# Patient Record
Sex: FEMALE | Race: WHITE | Employment: UNEMPLOYED | URBAN - METROPOLITAN AREA
[De-identification: names, ages, dates, MRNs, and addresses within clinical notes are randomized per-mention and may not be internally consistent; named-entity substitution may affect disease eponyms.]

---

## 2019-03-03 ENCOUNTER — APPOINTMENT (OUTPATIENT)
Dept: CT IMAGING | Age: 59
End: 2019-03-03
Payer: OTHER MISCELLANEOUS

## 2019-03-03 ENCOUNTER — HOSPITAL ENCOUNTER (EMERGENCY)
Age: 59
Discharge: HOME | End: 2019-03-03
Payer: OTHER MISCELLANEOUS

## 2019-03-03 VITALS
DIASTOLIC BLOOD PRESSURE: 73 MMHG | BODY MASS INDEX: 30.36 KG/M2 | OXYGEN SATURATION: 97 % | HEIGHT: 62 IN | TEMPERATURE: 97.9 F | HEART RATE: 82 BPM | WEIGHT: 165 LBS | SYSTOLIC BLOOD PRESSURE: 111 MMHG | RESPIRATION RATE: 18 BRPM
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
## 2019-03-03 DIAGNOSIS — S00.83XA CONTUSION OF FACE, INITIAL ENCOUNTER: ICD-10-CM

## 2019-03-03 DIAGNOSIS — S09.90XA CLOSED HEAD INJURY, INITIAL ENCOUNTER: ICD-10-CM

## 2019-03-03 PROCEDURE — 6370000000 HC RX 637 (ALT 250 FOR IP): Performed by: PHYSICIAN ASSISTANT

## 2019-03-03 PROCEDURE — 99284 EMERGENCY DEPT VISIT MOD MDM: CPT

## 2019-03-03 PROCEDURE — 70450 CT HEAD/BRAIN W/O DYE: CPT

## 2019-03-03 PROCEDURE — 70486 CT MAXILLOFACIAL W/O DYE: CPT

## 2019-03-03 RX ORDER — BUTALBITAL, ACETAMINOPHEN AND CAFFEINE 50; 325; 40 MG/1; MG/1; MG/1
1 TABLET ORAL EVERY 6 HOURS PRN
Qty: 20 TABLET | Refills: 0 | Status: SHIPPED | OUTPATIENT
Start: 2019-03-03 | End: 2019-08-08 | Stop reason: ALTCHOICE

## 2019-03-03 RX ORDER — BUTALBITAL, ACETAMINOPHEN AND CAFFEINE 50; 325; 40 MG/1; MG/1; MG/1
2 TABLET ORAL ONCE
Status: COMPLETED | OUTPATIENT
Start: 2019-03-03 | End: 2019-03-03

## 2019-03-03 RX ADMIN — BUTALBITAL, ACETAMINOPHEN AND CAFFEINE 2 TABLET: 50; 325; 40 TABLET ORAL at 02:53

## 2019-03-03 ASSESSMENT — PAIN SCALES - GENERAL: PAINLEVEL_OUTOF10: 8

## 2019-08-08 ENCOUNTER — HOSPITAL ENCOUNTER (OUTPATIENT)
Age: 59
Setting detail: OBSERVATION
Discharge: HOME | End: 2019-08-09
Attending: EMERGENCY MEDICINE | Admitting: FAMILY MEDICINE
Payer: COMMERCIAL

## 2019-08-08 ENCOUNTER — APPOINTMENT (OUTPATIENT)
Dept: GENERAL RADIOLOGY | Age: 59
End: 2019-08-08
Payer: COMMERCIAL

## 2019-08-08 DIAGNOSIS — R07.9 CHEST PAIN, UNSPECIFIED TYPE: Primary | ICD-10-CM

## 2019-08-08 DIAGNOSIS — F17.200 SMOKER: ICD-10-CM

## 2019-08-08 DIAGNOSIS — R94.31 ABNORMAL EKG: ICD-10-CM

## 2019-08-08 LAB
ANION GAP SERPL CALCULATED.3IONS-SCNC: 14 MMOL/L (ref 3–16)
BASOPHILS ABSOLUTE: 0.1 K/UL (ref 0–0.2)
BASOPHILS RELATIVE PERCENT: 0.6 %
BUN BLDV-MCNC: 17 MG/DL (ref 7–20)
CALCIUM SERPL-MCNC: 9.2 MG/DL (ref 8.3–10.6)
CHLORIDE BLD-SCNC: 104 MMOL/L (ref 99–110)
CO2: 23 MMOL/L (ref 21–32)
CREAT SERPL-MCNC: 0.8 MG/DL (ref 0.6–1.1)
EOSINOPHILS ABSOLUTE: 0.1 K/UL (ref 0–0.6)
EOSINOPHILS RELATIVE PERCENT: 0.7 %
GFR AFRICAN AMERICAN: >60
GFR NON-AFRICAN AMERICAN: >60
GLUCOSE BLD-MCNC: 103 MG/DL (ref 70–99)
HCT VFR BLD CALC: 41 % (ref 36–48)
HEMOGLOBIN: 13.4 G/DL (ref 12–16)
LYMPHOCYTES ABSOLUTE: 2.8 K/UL (ref 1–5.1)
LYMPHOCYTES RELATIVE PERCENT: 20.8 %
MCH RBC QN AUTO: 30.4 PG (ref 26–34)
MCHC RBC AUTO-ENTMCNC: 32.8 G/DL (ref 31–36)
MCV RBC AUTO: 92.7 FL (ref 80–100)
MONOCYTES ABSOLUTE: 0.7 K/UL (ref 0–1.3)
MONOCYTES RELATIVE PERCENT: 5.2 %
NEUTROPHILS ABSOLUTE: 9.9 K/UL (ref 1.7–7.7)
NEUTROPHILS RELATIVE PERCENT: 72.7 %
PDW BLD-RTO: 14 % (ref 12.4–15.4)
PLATELET # BLD: 301 K/UL (ref 135–450)
PMV BLD AUTO: 9.2 FL (ref 5–10.5)
POTASSIUM SERPL-SCNC: 3.6 MMOL/L (ref 3.5–5.1)
PRO-BNP: 30 PG/ML (ref 0–124)
RBC # BLD: 4.42 M/UL (ref 4–5.2)
SODIUM BLD-SCNC: 141 MMOL/L (ref 136–145)
TROPONIN: <0.01 NG/ML
WBC # BLD: 13.6 K/UL (ref 4–11)

## 2019-08-08 PROCEDURE — 6370000000 HC RX 637 (ALT 250 FOR IP): Performed by: FAMILY MEDICINE

## 2019-08-08 PROCEDURE — 84484 ASSAY OF TROPONIN QUANT: CPT

## 2019-08-08 PROCEDURE — 6360000002 HC RX W HCPCS: Performed by: FAMILY MEDICINE

## 2019-08-08 PROCEDURE — 2580000003 HC RX 258: Performed by: FAMILY MEDICINE

## 2019-08-08 PROCEDURE — 6370000000 HC RX 637 (ALT 250 FOR IP): Performed by: NURSE PRACTITIONER

## 2019-08-08 PROCEDURE — G0378 HOSPITAL OBSERVATION PER HR: HCPCS

## 2019-08-08 PROCEDURE — 36415 COLL VENOUS BLD VENIPUNCTURE: CPT

## 2019-08-08 PROCEDURE — 99285 EMERGENCY DEPT VISIT HI MDM: CPT

## 2019-08-08 PROCEDURE — 85025 COMPLETE CBC W/AUTO DIFF WBC: CPT

## 2019-08-08 PROCEDURE — 80048 BASIC METABOLIC PNL TOTAL CA: CPT

## 2019-08-08 PROCEDURE — 71046 X-RAY EXAM CHEST 2 VIEWS: CPT

## 2019-08-08 PROCEDURE — 94761 N-INVAS EAR/PLS OXIMETRY MLT: CPT

## 2019-08-08 PROCEDURE — 94640 AIRWAY INHALATION TREATMENT: CPT

## 2019-08-08 PROCEDURE — 93005 ELECTROCARDIOGRAM TRACING: CPT | Performed by: EMERGENCY MEDICINE

## 2019-08-08 PROCEDURE — 96372 THER/PROPH/DIAG INJ SC/IM: CPT

## 2019-08-08 PROCEDURE — 83880 ASSAY OF NATRIURETIC PEPTIDE: CPT

## 2019-08-08 RX ORDER — NITROGLYCERIN 0.4 MG/1
0.4 TABLET SUBLINGUAL EVERY 5 MIN PRN
Status: DISCONTINUED | OUTPATIENT
Start: 2019-08-08 | End: 2019-08-09 | Stop reason: HOSPADM

## 2019-08-08 RX ORDER — SODIUM CHLORIDE 0.9 % (FLUSH) 0.9 %
10 SYRINGE (ML) INJECTION PRN
Status: DISCONTINUED | OUTPATIENT
Start: 2019-08-08 | End: 2019-08-09 | Stop reason: HOSPADM

## 2019-08-08 RX ORDER — PSEUDOEPHEDRINE HYDROCHLORIDE 30 MG/1
30 TABLET ORAL EVERY 4 HOURS PRN
Status: ON HOLD | COMMUNITY
End: 2019-08-09 | Stop reason: HOSPADM

## 2019-08-08 RX ORDER — ASPIRIN 81 MG/1
81 TABLET, CHEWABLE ORAL DAILY
Status: DISCONTINUED | OUTPATIENT
Start: 2019-08-08 | End: 2019-08-09 | Stop reason: HOSPADM

## 2019-08-08 RX ORDER — ALBUTEROL SULFATE 90 UG/1
2 AEROSOL, METERED RESPIRATORY (INHALATION) EVERY 6 HOURS PRN
Status: DISCONTINUED | OUTPATIENT
Start: 2019-08-08 | End: 2019-08-09 | Stop reason: HOSPADM

## 2019-08-08 RX ORDER — SODIUM CHLORIDE 0.9 % (FLUSH) 0.9 %
10 SYRINGE (ML) INJECTION EVERY 12 HOURS SCHEDULED
Status: DISCONTINUED | OUTPATIENT
Start: 2019-08-08 | End: 2019-08-09 | Stop reason: HOSPADM

## 2019-08-08 RX ORDER — ONDANSETRON 2 MG/ML
4 INJECTION INTRAMUSCULAR; INTRAVENOUS EVERY 6 HOURS PRN
Status: DISCONTINUED | OUTPATIENT
Start: 2019-08-08 | End: 2019-08-09 | Stop reason: HOSPADM

## 2019-08-08 RX ADMIN — Medication 2 PUFF: at 21:02

## 2019-08-08 RX ADMIN — NITROGLYCERIN 1 INCH: 20 OINTMENT TOPICAL at 18:05

## 2019-08-08 RX ADMIN — ASPIRIN 81 MG 81 MG: 81 TABLET ORAL at 19:58

## 2019-08-08 RX ADMIN — ENOXAPARIN SODIUM 40 MG: 40 INJECTION SUBCUTANEOUS at 19:58

## 2019-08-08 RX ADMIN — Medication 10 ML: at 20:01

## 2019-08-08 ASSESSMENT — ENCOUNTER SYMPTOMS
SHORTNESS OF BREATH: 1
VOMITING: 0
CHEST TIGHTNESS: 0
ABDOMINAL PAIN: 0
DIARRHEA: 0
NAUSEA: 0

## 2019-08-08 ASSESSMENT — PAIN SCALES - GENERAL
PAINLEVEL_OUTOF10: 1
PAINLEVEL_OUTOF10: 0
PAINLEVEL_OUTOF10: 0

## 2019-08-08 ASSESSMENT — PAIN DESCRIPTION - LOCATION: LOCATION: CHEST

## 2019-08-08 ASSESSMENT — HEART SCORE: ECG: 1

## 2019-08-08 ASSESSMENT — PAIN DESCRIPTION - PAIN TYPE: TYPE: ACUTE PAIN

## 2019-08-08 NOTE — PROGRESS NOTES
Pt admitted to room 3304. Oriented to room and call light system. Pt has current everyday smoker. Under a lot of stress lately. No hx CAD or heart attack. Updated pt on POC. Aware of NPO @ midnight.

## 2019-08-09 VITALS
RESPIRATION RATE: 16 BRPM | HEIGHT: 62 IN | BODY MASS INDEX: 31.83 KG/M2 | WEIGHT: 173 LBS | TEMPERATURE: 97.4 F | SYSTOLIC BLOOD PRESSURE: 100 MMHG | DIASTOLIC BLOOD PRESSURE: 61 MMHG | HEART RATE: 82 BPM | OXYGEN SATURATION: 94 %

## 2019-08-09 LAB
EKG ATRIAL RATE: 94 BPM
EKG DIAGNOSIS: NORMAL
EKG P AXIS: 77 DEGREES
EKG P-R INTERVAL: 120 MS
EKG Q-T INTERVAL: 362 MS
EKG QRS DURATION: 82 MS
EKG QTC CALCULATION (BAZETT): 452 MS
EKG R AXIS: 61 DEGREES
EKG T AXIS: 87 DEGREES
EKG VENTRICULAR RATE: 94 BPM
LEFT VENTRICULAR EJECTION FRACTION HIGH VALUE: 65 %
LEFT VENTRICULAR EJECTION FRACTION MODE: NORMAL
LV EF: 64 %
LV EF: 65 %
LVEF MODALITY: NORMAL
LVEF MODALITY: NORMAL

## 2019-08-09 PROCEDURE — 6370000000 HC RX 637 (ALT 250 FOR IP): Performed by: FAMILY MEDICINE

## 2019-08-09 PROCEDURE — 3430000000 HC RX DIAGNOSTIC RADIOPHARMACEUTICAL: Performed by: INTERNAL MEDICINE

## 2019-08-09 PROCEDURE — 78452 HT MUSCLE IMAGE SPECT MULT: CPT | Performed by: INTERNAL MEDICINE

## 2019-08-09 PROCEDURE — G0378 HOSPITAL OBSERVATION PER HR: HCPCS

## 2019-08-09 PROCEDURE — 93017 CV STRESS TEST TRACING ONLY: CPT | Performed by: INTERNAL MEDICINE

## 2019-08-09 PROCEDURE — A9502 TC99M TETROFOSMIN: HCPCS | Performed by: INTERNAL MEDICINE

## 2019-08-09 PROCEDURE — 93010 ELECTROCARDIOGRAM REPORT: CPT | Performed by: INTERNAL MEDICINE

## 2019-08-09 PROCEDURE — 93306 TTE W/DOPPLER COMPLETE: CPT

## 2019-08-09 PROCEDURE — 6370000000 HC RX 637 (ALT 250 FOR IP): Performed by: INTERNAL MEDICINE

## 2019-08-09 PROCEDURE — 2580000003 HC RX 258: Performed by: FAMILY MEDICINE

## 2019-08-09 RX ORDER — ALBUTEROL SULFATE 90 UG/1
2 AEROSOL, METERED RESPIRATORY (INHALATION) EVERY 6 HOURS PRN
Qty: 1 INHALER | Refills: 3 | Status: SHIPPED | OUTPATIENT
Start: 2019-08-09

## 2019-08-09 RX ORDER — ACETAMINOPHEN 500 MG
1000 TABLET ORAL EVERY 6 HOURS PRN
Status: DISCONTINUED | OUTPATIENT
Start: 2019-08-09 | End: 2019-08-09 | Stop reason: HOSPADM

## 2019-08-09 RX ADMIN — TETROFOSMIN 30 MILLICURIE: 1.38 INJECTION, POWDER, LYOPHILIZED, FOR SOLUTION INTRAVENOUS at 08:37

## 2019-08-09 RX ADMIN — ACETAMINOPHEN 1000 MG: 500 TABLET ORAL at 04:01

## 2019-08-09 RX ADMIN — TETROFOSMIN 10 MILLICURIE: 1.38 INJECTION, POWDER, LYOPHILIZED, FOR SOLUTION INTRAVENOUS at 07:25

## 2019-08-09 RX ADMIN — Medication 10 ML: at 09:49

## 2019-08-09 RX ADMIN — ASPIRIN 81 MG 81 MG: 81 TABLET ORAL at 09:49

## 2019-08-09 ASSESSMENT — PAIN SCALES - GENERAL
PAINLEVEL_OUTOF10: 0
PAINLEVEL_OUTOF10: 0
PAINLEVEL_OUTOF10: 7
PAINLEVEL_OUTOF10: 0

## 2019-08-09 ASSESSMENT — PAIN DESCRIPTION - DESCRIPTORS: DESCRIPTORS: HEADACHE

## 2019-08-09 ASSESSMENT — PAIN DESCRIPTION - ONSET: ONSET: AWAKENED FROM SLEEP

## 2019-08-09 ASSESSMENT — PAIN DESCRIPTION - PAIN TYPE: TYPE: ACUTE PAIN

## 2019-08-09 NOTE — PROGRESS NOTES
Pt c/o a migraine states she normally takes Excedrin at home. I explained to Pt due to caffeine content we couldn't use that, but that I would send a message to MD to see if we could get her something else. Awaiting return message.

## 2019-08-09 NOTE — ED PROVIDER NOTES
[address], [address] [place] [address]  [id number]  [facility] [phone]            Discharge Medications:  Current Discharge Medication List          FINAL IMPRESSION  1. Chest pain, unspecified type    2. Abnormal EKG    3. Smoker        Blood pressure 119/68, pulse 92, temperature 97.8 °F (36.6 °C), temperature source Oral, resp. rate 18, height 5' 2\" (1.575 m), weight 173 lb 6 oz (78.6 kg), SpO2 95 %.      For further details of [facility] emergency department encounter, please see documentation by advanced practice provider, Estelle Sumner MD  08/08/19 4824
complete resolution of pain. She was at rest at time of pain with mild shortness of breath. She denies  obvious exacerbating factor. Patient is a cigarette smoker. She reports significant family history of coronary artery disease. She does not believe that she has history of hypertension, hyperlipidemia, or diabetes although she does not follow with primary care regularly. Patient is pain-free. She reports that the pain was dull and aching/pressure-like. CBC shows a leukocytosis white count of 13,600. BMP is unremarkable. Troponin negative. Chest x-ray shows no acute cardiopulmonary abnormality. BNP 30. Patient did take aspirin prior to arrival.  She is pain-free, Nitropaste was placed. She is never had a stress test or echocardiogram.  Heart score is 4-5. Her EKG has changed and she has new lateral T-wave inversions in the lateral leads, please see Dr. Lori Kelly interpretation. Patient will be admitted to hospitalist services for cardiac rule out. Patient is agreeable regarding plan of care. FINAL IMPRESSION      1. Chest pain, unspecified type    2. Abnormal EKG    3. Smoker          DISPOSITION/PLAN   DISPOSITION        PATIENT REFERREDTO:  No follow-up provider specified.     DISCHARGE MEDICATIONS:  New Prescriptions    No medications on file       DISCONTINUED MEDICATIONS:  Discontinued Medications    No medications on file              (Please note that portions ofthis note were completed with a voice recognition program.  Efforts were made to edit the dictations but occasionally words are mis-transcribed.)    Eartha Galeazzi, APRN - CNP (electronically signed)           Eartha Galeazzi, APRN - CNP  08/08/19 3214

## 2019-08-09 NOTE — PROGRESS NOTES
Patient instructed on Dany Protocol Stress Test Procedure including possible side effects and adverse reactions. Verbalizes knowledge and understanding and denies having any questions.

## 2019-08-09 NOTE — H&P
HOSPITALISTS HISTORY AND PHYSICAL    8/8/2019 10:21 PM    Patient Information:  Niels Kapoor is a 64 y.o. female [id number]  PCP:  Chago Hager MD (Tel: [phone] )    Chief complaint:    Chief Complaint   Patient presents with    Chest Pain     patient reports chest pain, with left arm pain and tingling and numbness around her lower face. took a couple aspirin and started to feel better around 1230. still has some tingling to left arm. has been under a lot of stress        History of Present Illness: Darell Cruz is a 64 y.o. female who presented with  H/o COPD, smoker presents with substernal chest pain with numbness down left arm present at rest. The sx occurred today while she was sitting watching tv. She has increased stressors at home. She took ASA that resolved the pain . She also reports intermittent leg swelling when she consumes extra salt. The pt has h/o GERD these sx are different today. No recent stress test.   Troponin is normal cxray is neg. EKG showed T wave changes. She is chest pain free now  REVIEW OF SYSTEMS:   Constitutional: Negative for fever,chills or night sweats  ENT: Negative for rhinorrhea, epistaxis, hoarseness, sore throat. Respiratory: Negative for shortness of breath,wheezing  Cardiovascular: +ve for chest pain, palpitations   Gastrointestinal: Negative for nausea, vomiting, diarrhea  Genitourinary: Negative for polyuria, dysuria   Hematologic/Lymphatic: Negative for bleeding tendency, easy bruising  Musculoskeletal: Negative for myalgias and arthralgias  Neurologic: Negative for confusion,dysarthria. Skin: Negative for itching,rash  Psychiatric: Negative for depression,anxiety, agitation. Endocrine: Negative for polydipsia,polyuria,heat /cold intolerance.     Past Medical History:   has a past medical history of Asthma, Bipolar 1 disorder ([facility] [address] COPD (chronic obstructive pulmonary disease) ([facility] [address] 81 mg Oral Daily Jose Antonio Olson MD   81 mg at 08/08/19 1958       Allergies:  No Known Allergies     Social History:   reports that she has been smoking cigarettes. She has a 35.00 pack-year smoking history. She has never used smokeless tobacco. She reports that she does not drink alcohol or use drugs. Family History:  family history includes Heart Attack in her mother and paternal grandmother; Heart Disease in her mother and paternal grandmother. ,     Physical Exam:  /68   Pulse 92   Temp 97.8 °F (36.6 °C) (Oral)   Resp 18   Ht 5' 2\" (1.575 m)   Wt 173 lb 6 oz (78.6 kg)   SpO2 95%   BMI 31.71 kg/m²     General appearance:  Appears comfortable. Well nourished  Eyes: Sclera clear, pupils equal  ENT: Moist mucus membranes, no thrush. Trachea midline. Cardiovascular: Regular rhythm, normal S1, S2. No murmur, gallop, rub. No edema in lower extremities  Respiratory: Clear to auscultation bilaterally, no wheeze, good inspiratory effort  Gastrointestinal: Abdomen soft, non-tender, not distended, normal bowel sounds  Musculoskeletal: No cyanosis in digits, neck supple  Neurology: Cranial nerves grossly intact. Alert and oriented in time, place and person. No speech or motor deficits  Psychiatry: Appropriate affect.  Not agitated  Skin: Warm, dry, normal turgor, no rash    Labs:  CBC:   Lab Results   Component Value Date    WBC 13.6 08/08/2019    RBC 4.42 08/08/2019    HGB 13.4 08/08/2019    HCT 41.0 08/08/2019    MCV 92.7 08/08/2019    MCH 30.4 08/08/2019    MCHC 32.8 08/08/2019    RDW 14.0 08/08/2019     08/08/2019    MPV 9.2 08/08/2019     BMP:    Lab Results   Component Value Date     08/08/2019    K 3.6 08/08/2019     08/08/2019    CO2 23 08/08/2019    BUN 17 08/08/2019    CREATININE 0.8 08/08/2019    CALCIUM 9.2 08/08/2019    GFRAA >60 08/08/2019    GFRAA >60 06/21/2011    LABGLOM >60 08/08/2019    GLUCOSE 103 08/08/2019       Chest Xray:   EKG:        Problem List  Active

## 2019-08-09 NOTE — DISCHARGE SUMMARY
[address]    Phone:  [phone]   · albuterol sulfate  (90 Base) MCG/ACT inhaler  · mometasone-formoterol 200-5 MCG/ACT inhaler         Discharge recommendations given to patient. Follow Up. in 1 week   Disposition. Home   Activity. As tolerated   Diet: Diet NPO, After Midnight      Spent > 30 minutes in discharge process.     Signed:  ERIN Guerrero CNP     8/9/2019 7:35 AM

## 2019-08-09 NOTE — CARE COORDINATION
Patient admitted as Observation/OPIB with an anticipated short hospitalization length of stay. Chart reviewed and it appears that patient has minimal needs for discharge at this time. Discussed with patients nurse and requested that case management be notified if discharge needs are identified. *Case management will continue to follow progress and update discharge plan as needed.     Rolo Andrew, BSN, CCM, RN  [facility]  [phone]

## 2022-02-28 ENCOUNTER — OFFICE VISIT (OUTPATIENT)
Dept: PULMONOLOGY | Age: 62
End: 2022-02-28
Payer: COMMERCIAL

## 2022-02-28 VITALS — HEART RATE: 68 BPM | OXYGEN SATURATION: 96 %

## 2022-02-28 DIAGNOSIS — F17.200 TOBACCO USE DISORDER: ICD-10-CM

## 2022-02-28 DIAGNOSIS — J96.11 CHRONIC HYPOXEMIC RESPIRATORY FAILURE (HCC): ICD-10-CM

## 2022-02-28 DIAGNOSIS — J44.9 COPD, MODERATE (HCC): ICD-10-CM

## 2022-02-28 DIAGNOSIS — J43.2 CENTRILOBULAR EMPHYSEMA (HCC): ICD-10-CM

## 2022-02-28 DIAGNOSIS — R06.02 SOB (SHORTNESS OF BREATH): Primary | ICD-10-CM

## 2022-02-28 PROCEDURE — 99204 OFFICE O/P NEW MOD 45 MIN: CPT | Performed by: INTERNAL MEDICINE

## 2022-02-28 RX ORDER — BUDESONIDE AND FORMOTEROL FUMARATE DIHYDRATE 160; 4.5 UG/1; UG/1
2 AEROSOL RESPIRATORY (INHALATION) 2 TIMES DAILY
COMMUNITY

## 2022-02-28 RX ORDER — IPRATROPIUM BROMIDE AND ALBUTEROL SULFATE 2.5; .5 MG/3ML; MG/3ML
1 SOLUTION RESPIRATORY (INHALATION) EVERY 6 HOURS PRN
Qty: 360 ML | Refills: 11 | Status: SHIPPED | OUTPATIENT
Start: 2022-02-28

## 2022-02-28 RX ORDER — CHOLECALCIFEROL (VITAMIN D3) 1250 MCG
CAPSULE ORAL
COMMUNITY

## 2022-02-28 ASSESSMENT — ENCOUNTER SYMPTOMS
ABDOMINAL PAIN: 0
SINUS PRESSURE: 0
NAUSEA: 0
CONSTIPATION: 0
DIARRHEA: 0

## 2022-02-28 NOTE — PROGRESS NOTES
[facility]  Consult Note  Alfred Terrell MD       Dhruv Salmon   YOB: 1960    Date of Visit:  2/28/2022    Assessment/Plan:  1. SOB (shortness of breath)  SOB with minimal exertion including showering    2. COPD, moderate ([address]  PFT 7/15:  Spirometry reveals normal FVC. FEV-1 is reduced at 1.41 L, which is 59%  predicted. FEV-1/FVC ratio is reduced at 47%. There is a 19% improvement in  FEV-1 after inhaled bronchodilators. This represents 270 mL. Lung volumes  reveal normal total lung capacity and vital capacity. Residual volume is  increased at 128% predicted. Diffusion capacity is decreased to 47%  predicted. IMPRESSION:  Moderate obstructive lung disease with airtrapping. There is  improvement in airflow after inhaled bronchodilators. Repeat PFT    Medication Management:  The patient benefits from the medical regimen and reports no complications. Symbicort  HHN Duoneb BID  Albuterol prn.     3. Centrilobular emphysema ([address]  Noted on CT 2015    4. Chronic hypoxemic respiratory failure (HCC)  She has O2 at home and uses with sleep and prn    5. Tobacco use disorder  Still smokes  >30 pk-yr exposure  Should have LDCT chest    3 months. Chief Complaint   Patient presents with    Shortness of Breath     needs recert for O2 (A-1)        HPI  The patient presents with a chief complaint of shortness of breath which is at least moderate in severity. She has mild associated cough. Exertion is a modifying factor. She was SOB walking into the office. She is also SOB showering. She was last seen here in 2015. She has had pneumonia a couple times since then. She is still smoking. She smokes about one pack per day but it varies depending on active or stressed she is. She has GERD symptoms at times but is not on any medication for this. She also has gallstones and kidney stones. She also has environmental allergies.  She does have oxygen at home which she uses with sleep and prn during the day. She has a dog at home (it is her son's and he lives with her). She is taking Symbicort BID and albuterol prn. She has HHN at home. Review of Systems  Review of Systems   Constitutional: Negative for fatigue and fever. HENT: Negative for congestion and sinus pressure. Eyes: Negative for visual disturbance. Cardiovascular: Negative for chest pain and palpitations. Gastrointestinal: Negative for abdominal pain, constipation, diarrhea and nausea. Genitourinary: Negative for difficulty urinating. Musculoskeletal: Negative for arthralgias. Skin: Negative for rash. Neurological: Negative for dizziness and light-headedness. Hematological: Does not bruise/bleed easily. Psychiatric/Behavioral: Negative for behavioral problems. History  I have reviewed past medical, surgical, social and family history. This is documented elsewhere in themedical record. Physical Exam:  Physical Exam  Constitutional:       General: She is not in acute distress. Appearance: She is well-developed. HENT:      Head: Normocephalic and atraumatic. Eyes:      Comments: Nasal mucosa, teeth and gums and oropharynx are clear. Neck:      Thyroid: No thyromegaly (There are no other neck masses noted. ). Vascular: No JVD. Trachea: No tracheal deviation. Cardiovascular:      Rate and Rhythm: Normal rate and regular rhythm. Heart sounds: Normal heart sounds. No murmur heard. Pulmonary:      Effort: Pulmonary effort is normal. No respiratory distress. Breath sounds: Normal breath sounds. No wheezing or rales. Chest:      Chest wall: No tenderness. Abdominal:      General: Bowel sounds are normal. There is no distension. Palpations: Abdomen is soft. Mass: There is no hepatosplenomegaly. Tenderness: There is no abdominal tenderness.    Musculoskeletal:         General: No tenderness (Muscle strength is normal and there is no obvious atrophy). Cervical back: Neck supple. Lymphadenopathy:      Cervical: No cervical adenopathy. Skin:     General: Skin is warm and dry. Findings: No rash. Psychiatric:      Comments: Oriented to person place and time         No Known Allergies  Prior to Visit Medications    Medication Sig Taking? Authorizing Provider   Cholecalciferol (VITAMIN D3) 1.25 MG (77719 UT) CAPS Take by mouth Yes Historical Provider, MD   budesonide-formoterol (SYMBICORT) 160-4.5 MCG/ACT AERO Inhale 2 puffs into the lungs 2 times daily Yes Historical Provider, MD   albuterol sulfate HFA (PROAIR HFA) 108 (90 Base) MCG/ACT inhaler Inhale 2 puffs into the lungs every 6 hours as needed for Wheezing Yes ERIN Blake CNP   VORTIoxetine (TRINTELLIX) 5 MG tablet Take 10 mg by mouth daily Yes Historical Provider, MD   nicotine (NICOTROL) 10 MG inhaler Inhale 1 puff into the lungs as needed for Smoking cessation  ERIN Alexandra CNP       Vitals:    22 1530   Pulse: 68   SpO2: 96%     There is no height or weight on file to calculate BMI.      Wt Readings from Last 3 Encounters:   19 173 lb (78.5 kg)   19 165 lb (74.8 kg)   17 180 lb (81.6 kg)     BP Readings from Last 3 Encounters:   19 100/61   19 111/73   17 (!) 145/46        Social History     Tobacco Use   Smoking Status Current Every Day Smoker    Packs/day: 1.00    Years: 35.00    Pack years: 35.00    Types: Cigarettes    Last attempt to quit: 2015    Years since quittin.8   Smokeless Tobacco Never Used

## 2022-09-13 ENCOUNTER — OFFICE VISIT (OUTPATIENT)
Dept: ORTHOPEDIC SURGERY | Age: 62
End: 2022-09-13
Payer: COMMERCIAL

## 2022-09-13 VITALS — WEIGHT: 173 LBS | BODY MASS INDEX: 31.83 KG/M2 | HEIGHT: 62 IN

## 2022-09-13 DIAGNOSIS — M25.512 LEFT SHOULDER PAIN, UNSPECIFIED CHRONICITY: Primary | ICD-10-CM

## 2022-09-13 PROCEDURE — 99204 OFFICE O/P NEW MOD 45 MIN: CPT | Performed by: ORTHOPAEDIC SURGERY

## 2022-09-14 ENCOUNTER — TELEPHONE (OUTPATIENT)
Dept: ORTHOPEDIC SURGERY | Age: 62
End: 2022-09-14

## 2022-09-14 RX ORDER — METHYLPREDNISOLONE 4 MG/1
2 TABLET ORAL DAILY
Qty: 3 TABLET | Refills: 0 | Status: SHIPPED | OUTPATIENT
Start: 2022-09-14 | End: 2022-09-20

## 2022-09-20 NOTE — PROGRESS NOTES
2022     Reason for visit:  Left shoulder and upper extremity pain    History of Present Illness: The patient is a 57-year-old female who presents for evaluation. She reports left upper extremity pain. She also reports numbness and tingling throughout the arm. The pain radiates from her neck down to her hand and fingers. This made worse with daily activities. She also occasionally has pain at night.     Medical History:  Past Medical History:   Diagnosis Date    Asthma     Bipolar 1 disorder (HCC)     COPD (chronic obstructive pulmonary disease) ([facility] [address]     Hyperlipidemia     Pneumonia 2015    Tobacco use disorder 2015      Past Surgical History:   Procedure Laterality Date    BRONCHOSCOPY  2015    With washings    COLONOSCOPY      DILATION AND CURETTAGE OF UTERUS        Family History   Problem Relation Age of Onset    Heart Disease Mother     Heart Attack Mother     Heart Attack Paternal Grandmother     Heart Disease Paternal Grandmother       Social History     Socioeconomic History    Marital status:      Spouse name: Not on file    Number of children: Not on file    Years of education: Not on file    Highest education level: Not on file   Occupational History    Not on file   Tobacco Use    Smoking status: Every Day     Packs/day: 1.00     Years: 35.00     Pack years: 35.00     Types: Cigarettes     Last attempt to quit: 2015     Years since quittin.3    Smokeless tobacco: Never   Substance and Sexual Activity    Alcohol use: No     Alcohol/week: 0.0 standard drinks    Drug use: No    Sexual activity: Not on file   Other Topics Concern    Not on file   Social History Narrative    Not on file     Social Determinants of Health     Financial Resource Strain: Not on file   Food Insecurity: Not on file   Transportation Needs: Not on file   Physical Activity: Not on file   Stress: Not on file   Social Connections: Not on file   Intimate Partner Violence: Not on file   Housing Stability: Not on file      Current Outpatient Medications on File Prior to Visit   Medication Sig Dispense Refill    Cholecalciferol (VITAMIN D3) 1.25 MG (68507 UT) CAPS Take by mouth      budesonide-formoterol (SYMBICORT) 160-4.5 MCG/ACT AERO Inhale 2 puffs into the lungs 2 times daily      ipratropium-albuterol (DUONEB) 0.5-2.5 (3) MG/3ML SOLN nebulizer solution Inhale 3 mLs into the lungs every 6 hours as needed for Shortness of Breath DX:COPD J44.9 360 mL 11    albuterol sulfate HFA (PROAIR HFA) 108 (90 Base) MCG/ACT inhaler Inhale 2 puffs into the lungs every 6 hours as needed for Wheezing 1 Inhaler 3    VORTIoxetine (TRINTELLIX) 5 MG tablet Take 10 mg by mouth daily      [DISCONTINUED] nicotine (NICOTROL) 10 MG inhaler Inhale 1 puff into the lungs as needed for Smoking cessation 1 Inhaler 3     No current facility-administered medications on file prior to visit. No Known Allergies     Review of Systems:  Constitutional: Patient is adequately groomed with no evidence of malnutrition  Mental Status: The patient is oriented to time, place and person. The patient's mood and affect are appropriate. Lymphatic: The lymphatic examination bilaterally reveals all areas to be without enlargement or induration. Vascular: Examination reveals no swelling or calf tenderness. Peripheral pulses are palpable and 2+. Neurological: The patient has good coordination. There is no weakness or sensory deficit. Skin:  Head/Neck: inspection reveals no rashes, ulcerations or lesions. Trunk: inspection reveals no rashes, ulcerations or lesions.     Objective:  Ht 5' 2\" (1.575 m)   Wt 173 lb (78.5 kg)   BMI 31.64 kg/m²      Physical Exam:  The patient is well-appearing and in no apparent distress  + Spurling's test  Examination of the left shoulder  There is no swelling, ecchymosis, or gross deformity  There is no evidence of muscle atrophy  + Garza test, neg Neers test  Neg bicipital groove tenderness, Neg AC joint tenderness  Range of motion reveals 140 degrees of forward flexion, 140 degrees of abduction, 50 degrees of external rotation, internal rotation to thoracic spine  5/5 strength with resisted abduction, 5/5 strength with resisted external rotation, 5/5 strength with resisted internal rotation  Intact motor and sensory function throughout the median/radial/ulnar/PIN/AIN distributions  Palpable radial pulse, brisk cap refill, 2+ symmetric reflexes     Imagin view x-rays of the left shoulder were obtained in the office today on 2022 and reviewed. There is no fracture or dislocation. Degenerative changes of acromioclavicular joint present. Assessment:  Left upper extremity pain. Suspect cervical radiculopathy    Plan:  I discussed with the patient the diagnosis and treatment options. We discussed operative and nonoperative management. At this point I do recommend nonoperative management. Nonoperative treatment options include activity modification, anti-inflammatory medications, physical therapy, and injections. We will prescribe a Medrol Dosepak and send her to physical therapy. The patient will return as needed. If she does remain symptomatic she will call us and neck step would be an MRI of the cervical spine. Greater than 45 minutes were spent with this encounter. Time spent included evaluating the patient's chart prior to arrival.  Evaluating the patient in the office including history, physical examination, imaging reviewing, and counseling on next steps. Lastly, time was spent discussing orders with my staff as well as providing documentation in the chart. Cecelia Palmer MD            Orthopaedic Surgery Sports Medicine and [address] [facility] and [address] [facility] Physician [facility] ([place]      Disclaimer: This note was dictated with voice recognition software.   Though review and correction are routine, we apologize for any errors.